# Patient Record
Sex: MALE | Race: BLACK OR AFRICAN AMERICAN
[De-identification: names, ages, dates, MRNs, and addresses within clinical notes are randomized per-mention and may not be internally consistent; named-entity substitution may affect disease eponyms.]

---

## 2022-08-10 ENCOUNTER — HOSPITAL ENCOUNTER (INPATIENT)
Dept: HOSPITAL 79 - ER1 | Age: 37
Discharge: ANOTHER HOSPITAL | DRG: 558 | End: 2022-08-10
Attending: INTERNAL MEDICINE | Admitting: INTERNAL MEDICINE
Payer: COMMERCIAL

## 2022-08-10 VITALS — BODY MASS INDEX: 35.21 KG/M2 | WEIGHT: 260 LBS | HEIGHT: 72 IN

## 2022-08-10 DIAGNOSIS — F19.10: ICD-10-CM

## 2022-08-10 DIAGNOSIS — Z82.49: ICD-10-CM

## 2022-08-10 DIAGNOSIS — F17.210: ICD-10-CM

## 2022-08-10 DIAGNOSIS — Z20.822: ICD-10-CM

## 2022-08-10 DIAGNOSIS — M62.82: Primary | ICD-10-CM

## 2022-08-10 DIAGNOSIS — B19.10: ICD-10-CM

## 2022-08-10 DIAGNOSIS — J45.909: ICD-10-CM

## 2022-08-10 DIAGNOSIS — Z21: ICD-10-CM

## 2022-08-10 LAB
BUN/CREATININE RATIO: 19 (ref 0–10)
HGB BLD-MCNC: 13.9 GM/DL (ref 14–17.5)
RED BLOOD COUNT: 4.47 M/UL (ref 4.2–5.5)
WHITE BLOOD COUNT: 8.3 K/UL (ref 4.5–11)

## 2022-08-10 PROCEDURE — U0002 COVID-19 LAB TEST NON-CDC: HCPCS

## 2022-08-10 NOTE — NUR
SINCE ARRIVING TO FLOOR PT. HAS BEEN PACING FLOOR AND ANXIOUS, STATING HE
WANTS TO LEAVE. STATES HE HAS A BUS TO [place]ATCH AND INSISTS I CALL DR. NEWBY.
DR. NEWBY AND LEV ([facility]). TALKING WITH PT. AND CONVERSATION ENDED IN
INITIATION OF INVOLUNTARY HOLD. SHORTLY AFTER PT. STARTS BECOME AGITATED AND
STATES HE'S LEAVING AND HEADS TOWARD ELEVATORS. [facility] CALLED AND
ADDITIONAL STAFF ARRIVED. CURRENTLY WAITING ON [facility]'S DEPT (CALLED APPROX
[phone]) TO COME TRANSPORT PT. FOR INVOLUNTARY HOLD.

## 2022-08-11 LAB — HCV AB: 0.2 (ref 0–0.9)
